# Patient Record
Sex: MALE | Race: WHITE | HISPANIC OR LATINO | Employment: FULL TIME | URBAN - NONMETROPOLITAN AREA
[De-identification: names, ages, dates, MRNs, and addresses within clinical notes are randomized per-mention and may not be internally consistent; named-entity substitution may affect disease eponyms.]

---

## 2017-08-09 ENCOUNTER — OFFICE VISIT (OUTPATIENT)
Dept: GASTROENTEROLOGY | Facility: CLINIC | Age: 42
End: 2017-08-09

## 2017-08-09 VITALS
HEART RATE: 70 BPM | OXYGEN SATURATION: 99 % | DIASTOLIC BLOOD PRESSURE: 76 MMHG | SYSTOLIC BLOOD PRESSURE: 106 MMHG | TEMPERATURE: 97.6 F | HEIGHT: 65 IN | WEIGHT: 136 LBS | BODY MASS INDEX: 22.66 KG/M2

## 2017-08-09 DIAGNOSIS — D64.9 ANEMIA, UNSPECIFIED TYPE: Primary | ICD-10-CM

## 2017-08-09 DIAGNOSIS — K62.5 BRBPR (BRIGHT RED BLOOD PER RECTUM): ICD-10-CM

## 2017-08-09 PROCEDURE — 99204 OFFICE O/P NEW MOD 45 MIN: CPT | Performed by: NURSE PRACTITIONER

## 2017-08-09 RX ORDER — MULTIPLE VITAMINS W/ MINERALS TAB 9MG-400MCG
1 TAB ORAL DAILY
COMMUNITY

## 2017-08-09 NOTE — PROGRESS NOTES
"[facility] GASTROENTEROLOGY - OFFICE NOTE    8/9/2017    Hai Duarte   1975    Primary Physician: Joel Palomo MD    Chief Complaint   Patient presents with   • Anemia   brbpr      HISTORY OF PRESENT ILLNESS    Hai Duarte is a 42 y.o. male presents with Microcytic anemia. States was diagnosed with low iron 2 years ago.  He did not keep this annual office visit last year with his primary care physician.  Recently he was told anemic again.  He just started on an over-the-counter iron supplementation.  He has never seen a hematologist.  He donated blood 2 years ago.  No history of blood transfusion.  He does have intermittent episodes of bright red blood per rectum over the last 7 years.  This is usually a small amount.  States that he had a colonoscopy around 7 years ago by Dr. Bhakta which noted some hemorrhoids.  He also admits to having EGD as well as M2A capsule study 7 years ago and was told he had a \"duodenal ulcer\".  He denies black stool.  Denies abdominal pain.  No unintentional weight loss.  No nausea vomiting or fevers.  Appetite is good.  He has been on AcipHex in the past, stopped 6 months ago and acid reflux symptoms are stable.  If he does have any issues with reflux or use Pepcid complete when necessary.  No aspirin or NSAIDs.      I have no CBC for review.  I do have iron studies from 07/21/2017 which were unremarkable.    Past Medical History:   Diagnosis Date   • History of duodenal ulcer     Per patient history   • History of hemorrhoids     Per patient history       Past Surgical History:   Procedure Laterality Date   • APPENDECTOMY     • CAPSULE ENDOSCOPY      m2a done 4/06.  dr bhakta.   • CHOLECYSTECTOMY     • COLONOSCOPY      3/06, dr bhakta.        Outpatient Prescriptions Marked as Taking for the 8/9/17 encounter (Office Visit) with FABIOLA Mayers   Medication Sig Dispense Refill   • FIBER PO Take  by mouth Daily.     • Multiple Vitamins-Minerals (MULTIVITAMIN WITH MINERALS) " "tablet tablet Take 1 tablet by mouth Daily.     • Probiotic Product (PROBIOTIC PO) Take  by mouth Daily.         No Known Allergies    Social History     Social History   • Marital status:      Spouse name: N/A   • Number of children: N/A   • Years of education: N/A     Occupational History   • Not on file.     Social History Main Topics   • Smoking status: Former Smoker   • Smokeless tobacco: Never Used   • Alcohol use Yes      Comment: occ   • Drug use: No   • Sexual activity: Not on file     Other Topics Concern   • Not on file     Social History Narrative       Family History   Problem Relation Age of Onset   • Colon cancer Neg Hx    • Colon polyps Neg Hx        Review of Systems   Constitutional: Negative for appetite change, chills, fatigue, fever and unexpected weight change.   HENT: Negative for sore throat and trouble swallowing.    Respiratory: Negative for cough, chest tightness, shortness of breath and wheezing.    Cardiovascular: Negative for chest pain and palpitations.   Gastrointestinal: Positive for anal bleeding and blood in stool. Negative for abdominal distention, abdominal pain, constipation, diarrhea, nausea, rectal pain and vomiting.        As mentioned in hpi   Genitourinary: Negative for difficulty urinating, dysuria and hematuria.   Musculoskeletal: Negative for arthralgias and back pain.   Skin: Negative for color change and rash.   Neurological: Positive for light-headedness (occasional). Negative for dizziness, syncope and headaches.   Psychiatric/Behavioral: Negative for confusion. The patient is not nervous/anxious.        Vitals:    08/09/17 0816   BP: 106/76   BP Location: Left arm   Patient Position: Sitting   Cuff Size: Adult   Pulse: 70   Temp: 97.6 °F (36.4 °C)   SpO2: 99%   Weight: 136 lb (61.7 kg)   Height: 65\" (165.1 cm)      Body mass index is 22.63 kg/(m^2).    Physical Exam   Constitutional: He appears well-developed and well-nourished. No distress.   HENT:   Head: " Normocephalic and atraumatic.   Eyes: EOM are normal. No scleral icterus.   Neck: Neck supple. No JVD present.   Cardiovascular: Normal rate, regular rhythm and normal heart sounds.    Pulmonary/Chest: Effort normal and breath sounds normal. No stridor.   Abdominal: Soft. Bowel sounds are normal. He exhibits no distension and no mass. There is no tenderness. There is no rebound and no guarding.   Musculoskeletal: He exhibits no edema.   Neurological: He is alert.   Skin: Skin is warm and dry. No rash noted.   Psychiatric: He has a normal mood and affect. His behavior is normal.   Vitals reviewed.      No results found for this or any previous visit.        ASSESSMENT AND PLAN    Hai was seen today for anemia.    Diagnoses and all orders for this visit:    Anemia, unspecified type  -     Case Request; Standing  -     Case Request    BRBPR (bright red blood per rectum)  -     Case Request; Standing  -     Case Request    Other orders  -     Implement Anesthesia Orders Day of Procedure; Standing  -     Obtain Informed Consent; Standing  -     Sod Picosulfate-Mag Ox-Cit Acd (PREPOPIK) 10-3.5-12 MG-GM-GM pack; Take 1 container by mouth 1 (One) Time for 1 dose. Take as directed per instruction sheet      Differential diagnosis discussed.  Request labs/cbc  from pcp.  Plan for colonoscopy.  ER if worsening bleeding.     COLONOSCOPY WITH ANESTHESIA (N/A) All risks, benefits, alternatives, and indications of colonoscopy procedure have been discussed with the patient. Risks to include perforation of the colon requiring possible surgery or colostomy, risk of bleeding from biopsies or removal of colon tissue, possibility of missing a colon polyp or cancer, or adverse drug reaction.  Benefits to include the diagnosis and management of disease of the colon and rectum. Alternatives to include barium enema, radiographic evaluation, lab testing or no intervention. Pt verbalizes understanding and agrees.         Body mass index is  22.63 kg/(m^2).           Trena Stahl, FABIOLA    EMR Dragon/transcription disclaimer:  Much of this encounter note is electronic transcription/translation of spoken language to printed text.  The electronic translation of spoken language may be erroneous, or at times, nonsensical words or phrases may be inadvertently transcribed.  Although I have reviewed the note for such errors, some may still exist.      Labs done 7/20/17 ferritin wnl,  fe wnl, tibc wnl, fe sat wnl.   Colonoscopy done 3/06,  Normal , normal TI.  Dr klein.      m2a done 4/06, small pyloric channel ulceration.

## 2017-09-29 ENCOUNTER — ANESTHESIA EVENT (OUTPATIENT)
Dept: GASTROENTEROLOGY | Facility: HOSPITAL | Age: 42
End: 2017-09-29

## 2017-09-29 ENCOUNTER — TELEPHONE (OUTPATIENT)
Dept: GASTROENTEROLOGY | Facility: CLINIC | Age: 42
End: 2017-09-29

## 2017-09-29 ENCOUNTER — ANESTHESIA (OUTPATIENT)
Dept: GASTROENTEROLOGY | Facility: HOSPITAL | Age: 42
End: 2017-09-29

## 2017-09-29 ENCOUNTER — HOSPITAL ENCOUNTER (OUTPATIENT)
Facility: HOSPITAL | Age: 42
Setting detail: HOSPITAL OUTPATIENT SURGERY
Discharge: HOME | End: 2017-09-29
Attending: INTERNAL MEDICINE | Admitting: INTERNAL MEDICINE

## 2017-09-29 VITALS
HEART RATE: 63 BPM | OXYGEN SATURATION: 100 % | RESPIRATION RATE: 13 BRPM | BODY MASS INDEX: 21.38 KG/M2 | WEIGHT: 133 LBS | SYSTOLIC BLOOD PRESSURE: 115 MMHG | DIASTOLIC BLOOD PRESSURE: 76 MMHG | TEMPERATURE: 97.4 F | HEIGHT: 66 IN

## 2017-09-29 PROCEDURE — 25010000002 PROPOFOL 10 MG/ML EMULSION: Performed by: NURSE ANESTHETIST, CERTIFIED REGISTERED

## 2017-09-29 PROCEDURE — 45378 DIAGNOSTIC COLONOSCOPY: CPT | Performed by: INTERNAL MEDICINE

## 2017-09-29 RX ORDER — SODIUM CHLORIDE 9 MG/ML
500 INJECTION, SOLUTION INTRAVENOUS CONTINUOUS PRN
Status: DISCONTINUED | OUTPATIENT
Start: 2017-09-29 | End: 2017-09-29 | Stop reason: HOSPADM

## 2017-09-29 RX ORDER — ONDANSETRON 2 MG/ML
4 INJECTION INTRAMUSCULAR; INTRAVENOUS ONCE AS NEEDED
Status: DISCONTINUED | OUTPATIENT
Start: 2017-09-29 | End: 2017-09-29 | Stop reason: HOSPADM

## 2017-09-29 RX ORDER — SODIUM CHLORIDE 9 MG/ML
100 INJECTION, SOLUTION INTRAVENOUS CONTINUOUS
Status: CANCELLED | OUTPATIENT
Start: 2017-09-29

## 2017-09-29 RX ORDER — SODIUM CHLORIDE 0.9 % (FLUSH) 0.9 %
1-10 SYRINGE (ML) INJECTION AS NEEDED
Status: CANCELLED | OUTPATIENT
Start: 2017-09-29

## 2017-09-29 RX ORDER — PROPOFOL 10 MG/ML
VIAL (ML) INTRAVENOUS AS NEEDED
Status: DISCONTINUED | OUTPATIENT
Start: 2017-09-29 | End: 2017-09-29 | Stop reason: SURG

## 2017-09-29 RX ORDER — LIDOCAINE HYDROCHLORIDE 20 MG/ML
INJECTION, SOLUTION INFILTRATION; PERINEURAL AS NEEDED
Status: DISCONTINUED | OUTPATIENT
Start: 2017-09-29 | End: 2017-09-29 | Stop reason: SURG

## 2017-09-29 RX ORDER — SODIUM CHLORIDE 0.9 % (FLUSH) 0.9 %
3 SYRINGE (ML) INJECTION AS NEEDED
Status: DISCONTINUED | OUTPATIENT
Start: 2017-09-29 | End: 2017-09-29 | Stop reason: HOSPADM

## 2017-09-29 RX ADMIN — LIDOCAINE HYDROCHLORIDE 0.5 ML: 10 INJECTION, SOLUTION EPIDURAL; INFILTRATION; INTRACAUDAL; PERINEURAL at 07:11

## 2017-09-29 RX ADMIN — SODIUM CHLORIDE 500 ML: 9 INJECTION, SOLUTION INTRAVENOUS at 07:11

## 2017-09-29 RX ADMIN — PROPOFOL 200 MG: 10 INJECTION, EMULSION INTRAVENOUS at 08:01

## 2017-09-29 RX ADMIN — LIDOCAINE HYDROCHLORIDE 50 MG: 20 INJECTION, SOLUTION INFILTRATION; PERINEURAL at 08:01

## 2017-09-29 NOTE — ANESTHESIA PREPROCEDURE EVALUATION
Anesthesia Evaluation     no history of anesthetic complications:  NPO Solid Status: > 8 hours  NPO Liquid Status: > 2 hours     Airway   Mallampati: I  TM distance: >3 FB  Neck ROM: full  no difficulty expected  Dental - normal exam     Pulmonary - normal exam    breath sounds clear to auscultation  (-) asthma, recent URI, sleep apnea, not a smoker  Cardiovascular - normal exam  Exercise tolerance: excellent (>7 METS)    Rhythm: regular  Rate: normal    (-) pacemaker, hypertension, past MI, angina, cardiac stents, CABG      Neuro/Psych  (-) seizures, TIA, CVA  GI/Hepatic/Renal/Endo    (-)  obesity, liver disease, no renal disease, diabetes, hypothyroidism, hyperthyroidism    Musculoskeletal     Abdominal    Substance History      OB/GYN          Other                                        Anesthesia Plan    ASA 1     general   total IV anesthesia  intravenous induction   Anesthetic plan and risks discussed with patient.

## 2017-09-29 NOTE — ANESTHESIA POSTPROCEDURE EVALUATION
Patient: Hai Duarte    Procedure Summary     Date Anesthesia Start Anesthesia Stop Room / Location    09/29/17 0755 0819  [facility] /  [facility]       Procedure Diagnosis Surgeon Provider    COLONOSCOPY WITH ANESTHESIA (N/A ) BRBPR (bright red blood per rectum); Anemia, unspecified type  (BRBPR (bright red blood per rectum) [K62.5]; Anemia, unspecified type [D64.9]) MD Ann Hernandez CRNA          Anesthesia Type: general  Last vitals  BP   117/76 (09/29/17 0835)    Temp        Pulse   63 (09/29/17 0835)   Resp   23 (09/29/17 0835)    SpO2   100 % (09/29/17 0835)      Post Anesthesia Care and Evaluation    Patient location during evaluation: PHASE II  Patient participation: complete - patient participated  Level of consciousness: awake and alert  Pain score: 0  Pain management: satisfactory to patient  Airway patency: patent  Anesthetic complications: No anesthetic complications  PONV Status: none  Cardiovascular status: acceptable  Respiratory status: acceptable  Hydration status: acceptable

## 2020-11-10 ENCOUNTER — CONSULT (OUTPATIENT)
Dept: ONCOLOGY | Facility: CLINIC | Age: 45
End: 2020-11-10

## 2020-11-10 ENCOUNTER — LAB (OUTPATIENT)
Dept: LAB | Facility: HOSPITAL | Age: 45
End: 2020-11-10

## 2020-11-10 VITALS
RESPIRATION RATE: 16 BRPM | SYSTOLIC BLOOD PRESSURE: 110 MMHG | BODY MASS INDEX: 23.01 KG/M2 | DIASTOLIC BLOOD PRESSURE: 70 MMHG | TEMPERATURE: 98.2 F | HEIGHT: 66 IN | WEIGHT: 143.2 LBS | HEART RATE: 66 BPM | OXYGEN SATURATION: 98 %

## 2020-11-10 DIAGNOSIS — D64.9 ANEMIA, UNSPECIFIED TYPE: Primary | ICD-10-CM

## 2020-11-10 DIAGNOSIS — D64.9 ANEMIA, UNSPECIFIED TYPE: ICD-10-CM

## 2020-11-10 LAB
ALBUMIN SERPL-MCNC: 4.6 G/DL (ref 3.5–5.2)
ALBUMIN/GLOB SERPL: 1.5 G/DL
ALP SERPL-CCNC: 71 U/L (ref 39–117)
ALT SERPL W P-5'-P-CCNC: 28 U/L (ref 1–41)
ANION GAP SERPL CALCULATED.3IONS-SCNC: 8 MMOL/L (ref 5–15)
ANISOCYTOSIS BLD QL: ABNORMAL
AST SERPL-CCNC: 21 U/L (ref 1–40)
BASOPHILS # BLD MANUAL: 0.19 10*3/MM3 (ref 0–0.2)
BASOPHILS NFR BLD AUTO: 3.1 % (ref 0–1.5)
BILIRUB SERPL-MCNC: 0.2 MG/DL (ref 0–1.2)
BUN SERPL-MCNC: 13 MG/DL (ref 6–20)
BUN/CREAT SERPL: 11.8 (ref 7–25)
CALCIUM SPEC-SCNC: 9.2 MG/DL (ref 8.6–10.5)
CHLORIDE SERPL-SCNC: 103 MMOL/L (ref 98–107)
CO2 SERPL-SCNC: 29 MMOL/L (ref 22–29)
CREAT SERPL-MCNC: 1.1 MG/DL (ref 0.76–1.27)
DEPRECATED RDW RBC AUTO: 40 FL (ref 37–54)
EOSINOPHIL # BLD MANUAL: 0.19 10*3/MM3 (ref 0–0.4)
EOSINOPHIL NFR BLD MANUAL: 3.1 % (ref 0.3–6.2)
ERYTHROCYTE [DISTWIDTH] IN BLOOD BY AUTOMATED COUNT: 17.5 % (ref 12.3–15.4)
FERRITIN SERPL-MCNC: 290.7 NG/ML (ref 30–400)
GFR SERPL CREATININE-BSD FRML MDRD: 72 ML/MIN/1.73
GLOBULIN UR ELPH-MCNC: 3.1 GM/DL
GLUCOSE SERPL-MCNC: 101 MG/DL (ref 65–99)
HCT VFR BLD AUTO: 43.8 % (ref 37.5–51)
HGB BLD-MCNC: 13.2 G/DL (ref 13–17.7)
IRON 24H UR-MRATE: 97 MCG/DL (ref 59–158)
IRON SATN MFR SERPL: 25 % (ref 20–50)
LYMPHOCYTES # BLD MANUAL: 2.86 10*3/MM3 (ref 0.7–3.1)
LYMPHOCYTES NFR BLD MANUAL: 12.2 % (ref 5–12)
LYMPHOCYTES NFR BLD MANUAL: 45.9 % (ref 19.6–45.3)
MCH RBC QN AUTO: 21.1 PG (ref 26.6–33)
MCHC RBC AUTO-ENTMCNC: 30.1 G/DL (ref 31.5–35.7)
MCV RBC AUTO: 69.9 FL (ref 79–97)
MICROCYTES BLD QL: ABNORMAL
MONOCYTES # BLD AUTO: 0.76 10*3/MM3 (ref 0.1–0.9)
NEUTROPHILS # BLD AUTO: 2.23 10*3/MM3 (ref 1.7–7)
NEUTROPHILS NFR BLD MANUAL: 35.7 % (ref 42.7–76)
OVALOCYTES BLD QL SMEAR: ABNORMAL
PLAT MORPH BLD: NORMAL
PLATELET # BLD AUTO: 273 10*3/MM3 (ref 140–450)
PMV BLD AUTO: 9.3 FL (ref 6–12)
POIKILOCYTOSIS BLD QL SMEAR: ABNORMAL
POTASSIUM SERPL-SCNC: 4 MMOL/L (ref 3.5–5.2)
PROT SERPL-MCNC: 7.7 G/DL (ref 6–8.5)
RBC # BLD AUTO: 6.27 10*6/MM3 (ref 4.14–5.8)
SODIUM SERPL-SCNC: 140 MMOL/L (ref 136–145)
TIBC SERPL-MCNC: 395 MCG/DL (ref 298–536)
TRANSFERRIN SERPL-MCNC: 265 MG/DL (ref 200–360)
WBC # BLD AUTO: 6.24 10*3/MM3 (ref 3.4–10.8)
WBC MORPH BLD: NORMAL

## 2020-11-10 PROCEDURE — 83021 HEMOGLOBIN CHROMOTOGRAPHY: CPT

## 2020-11-10 PROCEDURE — 80053 COMPREHEN METABOLIC PANEL: CPT

## 2020-11-10 PROCEDURE — 36415 COLL VENOUS BLD VENIPUNCTURE: CPT

## 2020-11-10 PROCEDURE — 85007 BL SMEAR W/DIFF WBC COUNT: CPT

## 2020-11-10 PROCEDURE — 84466 ASSAY OF TRANSFERRIN: CPT

## 2020-11-10 PROCEDURE — 82525 ASSAY OF COPPER: CPT

## 2020-11-10 PROCEDURE — 85025 COMPLETE CBC W/AUTO DIFF WBC: CPT

## 2020-11-10 PROCEDURE — 82728 ASSAY OF FERRITIN: CPT

## 2020-11-10 PROCEDURE — 99204 OFFICE O/P NEW MOD 45 MIN: CPT | Performed by: INTERNAL MEDICINE

## 2020-11-10 PROCEDURE — 85660 RBC SICKLE CELL TEST: CPT

## 2020-11-10 PROCEDURE — 84630 ASSAY OF ZINC: CPT

## 2020-11-10 PROCEDURE — 83540 ASSAY OF IRON: CPT

## 2020-11-10 NOTE — PROGRESS NOTES
[facility] GROUP HEMATOLOGY AND ONCOLOGY  [address]  [place] [address] [phone]    Patient Name: Hai Duarte  Encounter Date: 11/23/2020  YOB: 1975  Patient Number: [id number]      Subjective: 45 year old male who is being referred to hematology for secondary polycythemia.  Review of previous notes shows that the patient was seen by GI in 2017 when he had several years of on and off bright rec blood per rectum and was noted to have a microcytic anemia.  He started a over the counter iron supplementation .  At that time, he states that he had intermittent episodes of BRBPR and was found to have internal hemerrhoids that was described as small amounts with a colonsocopy that had been done approximately in 2010 that showed hemorrhoids. He had also had a EGD and a small bowel pill endoscopy that reportedly had shown a duodenal ulcer.     A repeat colonoscopy done 9/2017 showed:   - The examined portion of the ileum was normal.  - The entire examined colon is normal on direct and retroflexion views.  - No specimens collected.    It is not entirely clear but from the labs sent to our office, it seems that the patient may have been referred due to a high RBC number but NORMAL Hemoglobin and Hematocrit    Patient states that he he has some BRBPR when he strains hard which stops on it's own.  No other noted bleeding. Denies weight loss and in fact has gained some weight.     Patient was born in [facility] but he is of [place] ancestory.    On follow up on 11.23.20: no new complaints other than back pain from trying to do flips with an 8 year old    Past Medical History:   Diagnosis Date   • History of duodenal ulcer     Per patient history   • History of hemorrhoids     Per patient history       Oncology History:  Oncology/Hematology History    No history exists.       Past Surgical History:  Past Surgical History:   Procedure  Laterality Date   • APPENDECTOMY     • CAPSULE ENDOSCOPY      m2a done .  dr klein.   • CHOLECYSTECTOMY     • COLONOSCOPY      3/06, dr klein.    • COLONOSCOPY N/A 2017    Procedure: COLONOSCOPY WITH ANESTHESIA;  Surgeon: Prabhjot Bhatt MD;  Location: [facility] ENDOSCOPY;  Service:       ___________________________________________________________________________________________________________________________________________________  Medications:   Outpatient Encounter Medications as of 2020   Medication Sig Dispense Refill   • FIBER PO Take  by mouth Daily.     • Multiple Vitamins-Minerals (MULTIVITAMIN WITH MINERALS) tablet tablet Take 1 tablet by mouth Daily.     • Probiotic Product (PROBIOTIC PO) Take  by mouth Daily.       No facility-administered encounter medications on file as of 2020.      ___________________________________________________________________________________________________________________________________________________  Allergies:   Allergies   Allergen Reactions   • Doxycycline Rash       Social/Family History:   Family History   Problem Relation Age of Onset   • Diabetes Mother    • Hypertension Father    • Bipolar disorder Father    • Obesity Father    • No Known Problems Sister    • No Known Problems Brother    • No Known Problems Maternal Grandmother    • No Known Problems Maternal Grandfather    • No Known Problems Paternal Grandmother    • Emphysema Paternal Grandfather    • No Known Problems Brother    • Other Daughter         Mother of the daughter is Caucassian   • Colon cancer Neg Hx    • Colon polyps Neg Hx         /Single/:      Social History     Tobacco Use   • Smoking status: Former Smoker     Packs/day: 0.50     Years: 10.00     Pack years: 5.00     Types: Cigarettes     Quit date:      Years since quittin.8   • Smokeless tobacco: Never Used   Substance Use Topics   • Alcohol use: Yes     Comment: 2-3 weeks on weekends,  "usually beer sometimes burbon   • Drug use: No        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  No exposure to chemicals nor radiation  ___________________________________________________________________________________________________________________________________________________  Review of Systems   Constitutional: Negative for activity change, appetite change, chills, fatigue, fever, unexpected weight gain and unexpected weight loss.        8 lbs weight gain over a year   HENT: Negative for congestion, dental problem, ear pain, hearing loss, mouth sores, nosebleeds, sore throat, swollen glands and trouble swallowing.    Eyes: Negative for blurred vision, double vision, photophobia and pain.        Age related visual disturbances and he is supposed to wear glasses   Respiratory: Positive for shortness of breath. Negative for apnea, cough, chest tightness and wheezing.         SOB Comes on suddenly when \"I talk too much\" -- same on 11.23.20   Cardiovascular: Negative for chest pain, palpitations and leg swelling.   Gastrointestinal: Positive for diarrhea. Negative for abdominal distention, abdominal pain, blood in stool, constipation, nausea, vomiting and GERD.        Blood in the stool from hemerrhoid -- none on 11.23.20    RUQ fullness that comes on and off    Occassional diarrhea after GB removal -- same on 11.23.20   Endocrine: Negative for cold intolerance and heat intolerance.   Genitourinary: Negative for breast discharge, dysuria, frequency and hematuria.   Musculoskeletal: Negative for arthralgias, back pain, gait problem, myalgias and neck stiffness.   Skin: Positive for color change. Negative for pallor, rash, skin lesions and bruise.        In the area under the left ear on the mandible    More tanning of the skin but has been in the sun more this year   Allergic/Immunologic: Negative for environmental allergies and immunocompromised state.   Neurological: Negative for dizziness, seizures, " syncope, weakness, light-headedness, headache, memory problem and confusion.   Hematological: Negative for adenopathy. Does not bruise/bleed easily.   Psychiatric/Behavioral: Negative for suicidal ideas and depressed mood. The patient is not nervous/anxious.      ___________________________________________________________________________________________________________________________________________________  Physical Examination:   There were no vitals filed for this visit. There is no height or weight on file to calculate BSA.   Physical Exam  Constitutional:       Appearance: Normal appearance. He is normal weight.   HENT:      Head: Normocephalic and atraumatic.        Comments: Discoloration (darkening) area as above     Nose: Nose normal.      Mouth/Throat:      Mouth: Mucous membranes are dry.   Eyes:      Pupils: Pupils are equal, round, and reactive to light.   Neck:      Musculoskeletal: Neck supple.   Cardiovascular:      Rate and Rhythm: Normal rate and regular rhythm.      Pulses: Normal pulses.   Pulmonary:      Effort: Pulmonary effort is normal.      Breath sounds: Normal breath sounds.   Abdominal:      General: Abdomen is flat. Bowel sounds are normal.      Palpations: Abdomen is soft. There is no hepatomegaly or splenomegaly.   Musculoskeletal: Normal range of motion.   Lymphadenopathy:      Head:      Right side of head: No submental, submandibular, tonsillar, preauricular, posterior auricular or occipital adenopathy.      Left side of head: No submental, submandibular, tonsillar, preauricular, posterior auricular or occipital adenopathy.      Cervical: No cervical adenopathy.      Right cervical: No superficial or deep cervical adenopathy.     Left cervical: No superficial or deep cervical adenopathy.      Upper Body:      Right upper body: No supraclavicular or axillary adenopathy.      Left upper body: No supraclavicular or axillary adenopathy.      Lower Body: No right inguinal adenopathy. No  left inguinal adenopathy.   Skin:     General: Skin is warm and dry.      Comments: Tanned skin   Neurological:      Mental Status: He is alert and oriented to person, place, and time.   Psychiatric:         Mood and Affect: Mood normal.         Behavior: Behavior normal.      Comments: Very pleasant and cooperative       ___________________________________________________________________________________________________________________________________________________  Labs/X-ray results:     No results for input(s): HGB, HCT, MCV, WBC, RDW, MPV, PLT, AUTOIGPER, NEUTROABS, LYMPHSABS, MONOSABS, EOSABS, BASOSABS, AUTOIGNUM, NRBC, NEUTRELPCTM, MONOPCT, BASOPCT, ATYLMPCT, ANISOCYTOSIS, GIANTPLTS in the last 92634 hours.    Invalid input(s): LYMPHOCPCT, ABCMORPH    No results for input(s): GLUCOSE, NA, K, CO2, CL, ANIONGAP, CREATININE, BUN, BCR, CALCIUM, EGFRIFNONA, ALKPHOS, PROTEINTOT, ALT, AST, BILITOT, ALBUMIN, GLOB in the last 16225 hours.    Invalid input(s): LABIL    No results for input(s): MSPIKE, KAPPALAMB, IGLFLC, URICACID, FREEKAPPAL, CEA, LDH, REFLABREPO in the last 23679 hours.    No results for input(s): IRON, TIBC, LABIRON, FERRITIN, R4WQNMD, TSH, FOLATE in the last 16420 hours.    Invalid input(s): VITB12  ____________________________________________________________________________  Radiology: No results found.           ___________________________________________________________________________________________________________________________________________________  Assessment/Plans:   Date 7/20/17 10/1/20 11.10.20         WBC  6.8 6.24         RBC #  6.15 (H) 6.27         RDW   17.5% (H)         Hb  13.4 13.2         Hct  42.6 43.8         MCV  69 69.9         Plt  270 273         ANC  3.3 2.23         ALC  2.3 2.86         Ferritin 276  290         Iron 91  97         Iron SAT 29%  25%         Transferrin 223  265         TIBC 314  395         Hapto            LDH            Jareth            Antonio    79         Zinc   91         Retic            B12            Folate            Hep panel            HIV            Creatinine  1.08 1.10         Glucose  102 101         ALT  26 28         AST  20 21         Alk phos  60 71         PSA  0.5                                                              **High RBC with (low) normal Hb and Hct with LOW MCV, identified on 12.3.20 as alpha-0-thalassemia trait  -Patient is of  ancestry and with finding of a high RBC with a low normal hemoglobin and a low MCV with an increased RDW, brings to question possibility that the patient has a thalassemia  -In presence of  descent, the most common thalassemia form is a beta thalassemia but this does not mean that alpha thalassemia and not another possibility  -We will check the following tests in this patient:  1.  Hemoglobin electrophoresis Normal Adult hb as follows:   - Hb S Neg   - Hb F 0   - Hb A 98.1%   - Hb C 0   - Hb A2 1.9%  - as there is still suspicion for thalassemia, alpha thalessemia genetic studies will be sent on 11.23.20.  The results will be important for family members. Results returned on 12.3.20: Alpha-0-thalassemia trait, alpha alpha/-- Mutation(s) identified: --SEA   Interpretation: This result is most consistent with this individual being an unaffected carrier of alpha-thalassemia with two   alpha-globin gene deletions, also called alpha-0-thalassemia trait. Individuals with alpha-0-thalassemia trait can demonstrate microcytosis, hypocromia, and normal levels of HbA2 and HbF. However, they typically have no significant clinical findings. Additionally, this individual is negative for the Hemoglobin Constant Spring mutation. The presence of other non-deletional mutations, like point mutations, in the alpha-globin gene cannot be excluded as the assay does not detect these changes.  When both parents are carriers of   alpha-0-thalassemia, there is a 25% chance with each pregnancy that the child will be  "affected.  Co-inheritance of alpha-thalassemia and other hemoglobinopathies, such as beta-thalassemia and sickle cell disease is possible. Genetic counseling and hemoglobinopathy testing are recommended for this individual's reproductive partner and family members. Alpha-thalassemia is an autosomal recessive condition that results from a deletion or dysfunction of alpha-globin chains. There are four genes that make the alpha-globin   chain, which binds with the beta-globin chain to create the alpha alpha/beta beta hemoglobin tetramer. An imbalance of   alpha and beta chains can lead to disease, ranging from mild anemia to fatal hydrops fetalis. Hemoglobin Constant Spring   is a non-deletional alpha-thalassemia mutation that is more common in [place]. Hemoglobin Constant Spring is   caused by a mutation in the stop codon of the alpha(2)-globin gene that results in poor alpha-globin production.   2.  Iron profile (normal in 2017) WNL  3.  Ferritin (normal in 2017) WNL  4. Copper WNL  5. Zinc WNL  6. Peripheral smear: Elevated total red blood cell count with normal hemoglobin and hematocrit and microcytic hypochromic erythrocytes Slight/1+ target cells Normal total white blood cell count and differential     Platelets within normal range  ** Infection status:   · History of tinea barbae to be followed by PCP  **Metabolic / Renal:   · No current issues   ** Endocrine:   · No current issues   ** Coagulation / VTE prophylaxis:   - no current issues  ** GI:   -History of blood per rectum with colonoscopy, EGD and small bowel pill endoscopy showing (reportedly) \"duadenal ulcer\" and hemorrhoids in the past. To be followed by GI  ** Pulmonary:   · Current smoking status former  -Patient has a known history of snoring and was a smoker in the past, both of which can increase RBC number.  Currently on a CPAP  ** Cardiology:   - no current issues  ** Skin / Rash:   · No current issues   ** :   · BPH to be followed by " PCP  ** Rheumatology  - no current issues  ** Neurologic:   · No current issues   ** Psychosocial:   · Chronic depression and insomnia being followed by PCP  ** Pain control:   - no current issue  ** Health Maintenance  -  colonoscopy done 9/2017 showed: - The examined portion of the ileum was normal.- The entire examined colon is normal on direct and retroflexion views.- No specimens collected.  - EGD and small pill endoscopy  in approximately 2010 reportedly showed duodenal ulcer    - Can follow up with NP in 4-6 weeks    Amit Goldberg, MD

## 2020-11-11 LAB
HGB A MFR BLD: 98.1 % (ref 96.4–98.8)
HGB A2 MFR BLD COLUMN CHROM: 1.9 % (ref 1.8–3.2)
HGB C MFR BLD: 0 %
HGB F MFR BLD: 0 % (ref 0–2)
HGB FRACT BLD-IMP: NORMAL
HGB S BLD QL SOLY: NEGATIVE
HGB S MFR BLD: 0 %

## 2020-11-12 LAB — COPPER SERPL-MCNC: 79 UG/DL (ref 72–166)

## 2020-11-13 LAB — ZINC SERPL-MCNC: 91 UG/DL (ref 56–134)

## 2020-11-23 ENCOUNTER — OFFICE VISIT (OUTPATIENT)
Dept: ONCOLOGY | Facility: CLINIC | Age: 45
End: 2020-11-23

## 2020-11-23 ENCOUNTER — LAB (OUTPATIENT)
Dept: LAB | Facility: HOSPITAL | Age: 45
End: 2020-11-23

## 2020-11-23 VITALS
DIASTOLIC BLOOD PRESSURE: 78 MMHG | SYSTOLIC BLOOD PRESSURE: 122 MMHG | RESPIRATION RATE: 16 BRPM | HEIGHT: 66 IN | WEIGHT: 145.9 LBS | TEMPERATURE: 97.7 F | HEART RATE: 64 BPM | BODY MASS INDEX: 23.45 KG/M2 | OXYGEN SATURATION: 99 %

## 2020-11-23 DIAGNOSIS — D75.1 POLYCYTHEMIA: Primary | ICD-10-CM

## 2020-11-23 DIAGNOSIS — D75.1 POLYCYTHEMIA: ICD-10-CM

## 2020-11-23 PROCEDURE — 36415 COLL VENOUS BLD VENIPUNCTURE: CPT

## 2020-11-23 PROCEDURE — 81257 HBA1/HBA2 GENE: CPT

## 2020-11-23 PROCEDURE — 99212 OFFICE O/P EST SF 10 MIN: CPT | Performed by: INTERNAL MEDICINE

## 2020-11-23 PROCEDURE — 85060 BLOOD SMEAR INTERPRETATION: CPT

## 2020-11-24 LAB
CYTOLOGIST CVX/VAG CYTO: NORMAL
PATH INTERP BLD-IMP: NORMAL

## 2020-12-03 LAB — HBA1 GENE MUT ANL BLD/T: NORMAL

## 2020-12-22 ENCOUNTER — APPOINTMENT (OUTPATIENT)
Dept: LAB | Facility: HOSPITAL | Age: 45
End: 2020-12-22

## 2021-02-03 ENCOUNTER — IMMUNIZATION (OUTPATIENT)
Dept: VACCINE CLINIC | Facility: HOSPITAL | Age: 46
End: 2021-02-03

## 2021-02-03 PROCEDURE — 91301 HC SARSCO02 VAC 100MCG/0.5ML IM: CPT | Performed by: OBSTETRICS & GYNECOLOGY

## 2021-02-03 PROCEDURE — 0011A: CPT | Performed by: OBSTETRICS & GYNECOLOGY

## 2021-03-03 ENCOUNTER — IMMUNIZATION (OUTPATIENT)
Dept: VACCINE CLINIC | Facility: HOSPITAL | Age: 46
End: 2021-03-03

## 2021-03-03 PROCEDURE — 0012A: CPT | Performed by: OBSTETRICS & GYNECOLOGY

## 2021-03-03 PROCEDURE — 91301 HC SARSCO02 VAC 100MCG/0.5ML IM: CPT | Performed by: OBSTETRICS & GYNECOLOGY

## 2022-11-28 ENCOUNTER — NURSE TRIAGE (OUTPATIENT)
Dept: CALL CENTER | Facility: HOSPITAL | Age: 47
End: 2022-11-28

## 2022-11-28 NOTE — TELEPHONE ENCOUNTER
Caller advised to call office in am to clarify the Tamiflu prescription as he tested neg today. Caller agrees to follow care advice.    Reason for Disposition  • [1] Caller has NON-URGENT medicine question about med that PCP prescribed AND [2] triager unable to answer question    Additional Information  • Negative: [1] Intentional drug overdose AND [2] suicidal thoughts or ideas  • Negative: Drug overdose and triager unable to answer question  • Negative: Caller requesting information unrelated to medicine  • Negative: Caller requesting information about COVID-19 Vaccine  • Negative: Caller requesting information about Emergency Contraception  • Negative: Caller requesting information about Combined Birth Control Pills  • Negative: Caller requesting information about Progestin Birth Control Pills  • Negative: Caller requesting information about Post-Op pain or medicines  • Negative: Caller requesting a prescription antibiotic (such as Penicillin) for Strep throat and has a positive culture result  • Negative: Caller requesting a prescription anti-viral med (such as Tamiflu) and has influenza (flu)  symptoms  • Negative: Immunization reaction suspected  • Negative: Rash while taking a medicine or within 3 days of stopping it  • Negative: [1] Asthma and [2] having symptoms of asthma (cough, wheezing, etc.)  • Negative: [1] Symptom of illness (e.g., headache, abdominal pain, earache, vomiting) AND [2] more than mild  • Negative: Breastfeeding questions about mother's medicines and diet  • Negative: MORE THAN A DOUBLE DOSE of a prescription or over-the-counter (OTC) drug  • Negative: [1] DOUBLE DOSE (an extra dose or lesser amount) of prescription drug AND [2] any symptoms (e.g., dizziness, nausea, pain, sleepiness)  • Negative: [1] DOUBLE DOSE (an extra dose or lesser amount) of over-the-counter (OTC) drug AND [2] any symptoms (e.g., dizziness, nausea, pain, sleepiness)  • Negative: Took another person's prescription  "drug  • Negative: [1] DOUBLE DOSE (an extra dose or lesser amount) of prescription drug AND [2] NO symptoms (Exception: a double dose of antibiotics)  • Negative: Diabetes drug error or overdose (e.g., took wrong type of insulin or took extra dose)  • Negative: [1] Prescription refill request for ESSENTIAL medicine (i.e., likelihood of harm to patient if not taken) AND [2] triager unable to refill per department policy  • Negative: [1] Prescription not at pharmacy AND [2] was prescribed by PCP recently (Exception: triager has access to EMR and prescription is recorded there. Go to Home Care and confirm for pharmacy.)  • Negative: [1] Pharmacy calling with prescription question AND [2] triager unable to answer question  • Negative: [1] Caller has URGENT medicine question about med that PCP or specialist prescribed AND [2] triager unable to answer question  • Negative: Medicine patch causing local rash or itching  • Negative: [1] Caller has medicine question about med NOT prescribed by PCP AND [2] triager unable to answer question (e.g., compatibility with other med, storage)  • Negative: Prescription request for new medicine (not a refill)  • Negative: Prescription refill request for a CONTROLLED substance (e.g., narcotics, ADHD medicines)  • Negative: [1] Prescription refill request for NON-ESSENTIAL medicine (i.e., no harm to patient if med not taken) AND [2] triager unable to refill per department policy    Answer Assessment - Initial Assessment Questions  1. NAME of MEDICATION: \"What medicine are you calling about?\"      Asking about Tamiflu prescription  2. QUESTION: \"What is your question?\" (e.g., medication refill, side effect)      Asking if the prescription was sent in error.   3. PRESCRIBING HCP: \"Who prescribed it?\" Reason: if prescribed by specialist, call should be referred to that group.      *No Answer*  4. SYMPTOMS: \"Do you have any symptoms?\"      *No Answer*  5. SEVERITY: If symptoms are present, " "ask \"Are they mild, moderate or severe?\"      *No Answer*  6. PREGNANCY:  \"Is there any chance that you are pregnant?\" \"When was your last menstrual period?\"      *No Answer*    Protocols used: MEDICATION QUESTION CALL-ADULT-      "

## 2023-04-13 ENCOUNTER — OFFICE VISIT (OUTPATIENT)
Dept: PULMONOLOGY | Facility: CLINIC | Age: 48
End: 2023-04-13
Payer: COMMERCIAL

## 2023-04-13 VITALS
DIASTOLIC BLOOD PRESSURE: 76 MMHG | SYSTOLIC BLOOD PRESSURE: 122 MMHG | HEART RATE: 68 BPM | HEIGHT: 66 IN | BODY MASS INDEX: 22.66 KG/M2 | WEIGHT: 141 LBS | OXYGEN SATURATION: 99 %

## 2023-04-13 DIAGNOSIS — Z86.16 HISTORY OF COVID-19: ICD-10-CM

## 2023-04-13 DIAGNOSIS — R05.9 COUGH, UNSPECIFIED TYPE: Primary | Chronic | ICD-10-CM

## 2023-04-13 DIAGNOSIS — J30.9 ALLERGIC RHINITIS, UNSPECIFIED SEASONALITY, UNSPECIFIED TRIGGER: ICD-10-CM

## 2023-04-13 PROCEDURE — 36415 COLL VENOUS BLD VENIPUNCTURE: CPT | Performed by: NURSE PRACTITIONER

## 2023-04-13 PROCEDURE — 99204 OFFICE O/P NEW MOD 45 MIN: CPT | Performed by: NURSE PRACTITIONER

## 2023-04-13 PROCEDURE — 94664 DEMO&/EVAL PT USE INHALER: CPT | Performed by: NURSE PRACTITIONER

## 2023-04-13 RX ORDER — TRIAMCINOLONE ACETONIDE 55 UG/1
SPRAY, METERED NASAL
COMMUNITY
Start: 2023-03-16

## 2023-04-13 RX ORDER — TIOTROPIUM BROMIDE INHALATION SPRAY 3.12 UG/1
2 SPRAY, METERED RESPIRATORY (INHALATION)
Qty: 2 EACH | Refills: 0 | COMMUNITY
Start: 2023-04-13

## 2023-04-13 RX ORDER — RABEPRAZOLE SODIUM 20 MG/1
1 TABLET, DELAYED RELEASE ORAL DAILY
COMMUNITY

## 2023-04-13 RX ORDER — MONTELUKAST SODIUM 10 MG/1
10 TABLET ORAL NIGHTLY
COMMUNITY

## 2023-04-13 NOTE — PROGRESS NOTES
"Chief Complaint  Asthma    Subjective    History of Present Illness      Hai Duarte presents to [facility] for:    History of Present Illness   New patient referral from Dr. Palomo with a chief complaint of persistent cough with wheezing that has been present since having had COVID in May 2022.  He reports that he has been treated with antibiotics and steroids 2-3 different times and he is better while on medication but then the cough returns.  He reports that the severity and frequency have decreased but the cough still persist.  He has a history of seasonal allergic rhinitis.  He was recently started on Singulair.  He also uses Nasacort nasal spray.  He smoked from the age of 13 to the age of 28.  He has never had any previous pulmonary issues.  He had [place] [facility] which I reviewed through the portal.  I recommend that he trial Spiriva 2 puffs daily.  Proper demonstration of the Respimat device was shown.  Labs have been drawn for IgE plus allergens, CBC with differential and CMP.  He will have complete PFTs done at [facility].  He stays up-to-date on most vaccines.  Objective   Vital Signs:   /76   Pulse 68   Ht 167.6 cm (66\")   Wt 64 kg (141 lb)   SpO2 99% Comment: RA  BMI 22.76 kg/m²     Physical Exam  Vitals and nursing note reviewed.   Constitutional:       Appearance: He is well-developed.   HENT:      Head: Normocephalic and atraumatic.   Eyes:      Pupils: Pupils are equal, round, and reactive to light.   Cardiovascular:      Rate and Rhythm: Normal rate and regular rhythm.   Abdominal:      General: Bowel sounds are normal. There is no distension.      Palpations: Abdomen is soft.   Musculoskeletal:         General: Normal range of motion.      Cervical back: Normal range of motion and neck supple.   Skin:     General: Skin is warm and dry.   Neurological:      Mental Status: He is alert and oriented to " person, place, and time.        Result Review :   The following data was reviewed by: FABIOLA Feldman on 04/13/2023:  Chest x-ray from [facility] diagnostic 6/8/2022  No results found for this or any previous visit.               Assessment and Plan      Diagnoses and all orders for this visit:    1. Cough, unspecified type (Primary)  Comments:  Present for the past 11 months.  PFTs at [facility].  Trial Spiriva 2 puffs daily.  Orders:  -     tiotropium bromide monohydrate (Spiriva Respimat) 2.5 MCG/ACT aerosol solution inhaler; Inhale 2 puffs Daily.  Dispense: 2 each; Refill: 0  -     Full Pulmonary Function Test With Bronchodilator; Future    2. Allergic rhinitis, unspecified seasonality, unspecified trigger  Comments:  Labs for IgE plus allergens and CBC with diff to rule out elevated eosinophils or an allergy driven cough.  Continue Singulair and nasal spray.  Orders:  -     CBC & Differential  -     IgE + Allergens (22)  -     Basic Metabolic Panel    3. History of COVID-19  Comments:  With persistent post viral cough.  Obtain complete PFT at [facility].  Trial Spiriva.        FABIOLA Feldman  4/13/2023  14:15 CDT    Follow Up   Return in about 4 weeks (around 5/11/2023) for CPFT at [facility], lab work.    Patient was given instructions and counseling regarding his condition or for health maintenance advice. Please see specific information pulled into the AVS if appropriate.

## 2023-04-13 NOTE — LETTER
"April 13, 2023     Joel Palomo MD  [address] [facility] [address]    Patient: Hai Duarte   YOB: 1975   Date of Visit: 4/13/2023     Dear Dr. Stacia MD:    Thank you for referring Hai Duarte to me for evaluation. Below are the relevant portions of my assessment and plan of care.    If you have questions, please do not hesitate to call me. I look forward to following Hai along with you.         Sincerely,        Mikie Diane, FABIOLA        CC: No Recipients      Progress Notes:  Chief Complaint  Asthma    Subjective     History of Present Illness      Hai Duarte presents to [facility]UL[facility]ARE [facility]IC[facility] for:    History of Present Illness   New patient referral from Dr. Palomo with a chief complaint of persistent cough with wheezing that has been present since having had COVID in May 2022.  He reports that he has been treated with antibiotics and steroids 2-3 different times and he is better while on medication but then the cough returns.  He reports that the severity and frequency have decreased but the cough still persist.  He has a history of seasonal allergic rhinitis.  He was recently started on Singulair.  He also uses Nasacort nasal spray.  He smoked from the age of 13 to the age of 28.  He has never had any previous pulmonary issues.  He had a normal chest x-ray done at [facility] on 6/8/2022 which I reviewed through the portal.  I recommend that he trial Spiriva 2 puffs daily.  Proper demonstration of the Respimat device was shown.  Labs have been drawn for IgE plus allergens, CBC with differential and CMP.  He will have complete PFTs done at [facility].  He stays up-to-date on most vaccines.  Objective    Vital Signs:   /76   Pulse 68   Ht 167.6 cm (66\")   Wt 64 kg (141 lb)   SpO2 99% Comment: RA  BMI 22.76 kg/m²     Physical Exam  Vitals and nursing note reviewed.   Constitutional:       Appearance: He is well-developed.   HENT: "      Head: Normocephalic and atraumatic.   Eyes:      Pupils: Pupils are equal, round, and reactive to light.   Cardiovascular:      Rate and Rhythm: Normal rate and regular rhythm.   Abdominal:      General: Bowel sounds are normal. There is no distension.      Palpations: Abdomen is soft.   Musculoskeletal:         General: Normal range of motion.      Cervical back: Normal range of motion and neck supple.   Skin:     General: Skin is warm and dry.   Neurological:      Mental Status: He is alert and oriented to person, place, and time.        Result Review :   The following data was reviewed by: FABIOLA Feldman on 04/13/2023:  Chest x-ray from [facility] 6/8/2022  No results found for this or any previous visit.              Assessment and Plan      Diagnoses and all orders for this visit:    1. Cough, unspecified type (Primary)  Comments:  Present for the past 11 months.  PFTs at [facility].  Trial Spiriva 2 puffs daily.  Orders:  -     tiotropium bromide monohydrate (Spiriva Respimat) 2.5 MCG/ACT aerosol solution inhaler; Inhale 2 puffs Daily.  Dispense: 2 each; Refill: 0  -     Full Pulmonary Function Test With Bronchodilator; Future    2. Allergic rhinitis, unspecified seasonality, unspecified trigger  Comments:  Labs for IgE plus allergens and CBC with diff to rule out elevated eosinophils or an allergy driven cough.  Continue Singulair and nasal spray.  Orders:  -     CBC & Differential  -     IgE + Allergens (22)  -     Basic Metabolic Panel    3. History of COVID-19  Comments:  With persistent post viral cough.  Obtain complete PFT at [facility].  Trial Spiriva.        FABIOLA Feldman  4/13/2023  14:15 CDT    Follow Up   Return in about 4 weeks (around 5/11/2023) for CPFT at [facility], lab work.    Patient was given instructions and counseling regarding his condition or for health maintenance advice. Please see specific information pulled into the AVS if appropriate.

## 2023-04-15 LAB
A ALTERNATA IGE QN: <0.1 KU/L
A FUMIGATUS IGE QN: <0.1 KU/L
BASOPHILS # BLD AUTO: 0.1 X10E3/UL (ref 0–0.2)
BASOPHILS NFR BLD AUTO: 2 %
BERMUDA GRASS IGE QN: <0.1 KU/L
BOXELDER IGE QN: <0.1 KU/L
BUN SERPL-MCNC: 14 MG/DL (ref 6–24)
BUN/CREAT SERPL: 13 (ref 9–20)
C HERBARUM IGE QN: <0.1 KU/L
CALCIUM SERPL-MCNC: 9.4 MG/DL (ref 8.7–10.2)
CAT DANDER IGE QN: <0.1 KU/L
CHLORIDE SERPL-SCNC: 101 MMOL/L (ref 96–106)
CO2 SERPL-SCNC: 26 MMOL/L (ref 20–29)
COMMON RAGWEED IGE QN: <0.1 KU/L
CONV CLASS DESCRIPTION: NORMAL
COTTONWOOD IGE QN: <0.1 KU/L
CREAT SERPL-MCNC: 1.06 MG/DL (ref 0.76–1.27)
D FARINAE IGE QN: <0.1 KU/L
D PTERONYSS IGE QN: <0.1 KU/L
DOG DANDER IGE QN: <0.1 KU/L
EGFRCR SERPLBLD CKD-EPI 2021: 86 ML/MIN/1.73
EOSINOPHIL # BLD AUTO: 0.3 X10E3/UL (ref 0–0.4)
EOSINOPHIL NFR BLD AUTO: 5 %
ERYTHROCYTE [DISTWIDTH] IN BLOOD BY AUTOMATED COUNT: 17.4 % (ref 11.6–15.4)
GLUCOSE SERPL-MCNC: 102 MG/DL (ref 70–99)
HCT VFR BLD AUTO: 46.3 % (ref 37.5–51)
HGB BLD-MCNC: 14.4 G/DL (ref 13–17.7)
IGE SERPL-ACNC: 195 IU/ML (ref 6–495)
IMM GRANULOCYTES # BLD AUTO: 0 X10E3/UL (ref 0–0.1)
IMM GRANULOCYTES NFR BLD AUTO: 0 %
LYMPHOCYTES # BLD AUTO: 2.2 X10E3/UL (ref 0.7–3.1)
LYMPHOCYTES NFR BLD AUTO: 32 %
MCH RBC QN AUTO: 21.5 PG (ref 26.6–33)
MCHC RBC AUTO-ENTMCNC: 31.1 G/DL (ref 31.5–35.7)
MCV RBC AUTO: 69 FL (ref 79–97)
MONOCYTES # BLD AUTO: 0.5 X10E3/UL (ref 0.1–0.9)
MONOCYTES NFR BLD AUTO: 8 %
MT JUNIPER IGE QN: <0.1 KU/L
NETTLE IGE QN: <0.1 KU/L
NEUTROPHILS # BLD AUTO: 3.6 X10E3/UL (ref 1.4–7)
NEUTROPHILS NFR BLD AUTO: 53 %
P NOTATUM IGE QN: <0.1 KU/L
PLATELET # BLD AUTO: 301 X10E3/UL (ref 150–450)
POTASSIUM SERPL-SCNC: 4.2 MMOL/L (ref 3.5–5.2)
RBC # BLD AUTO: 6.7 X10E6/UL (ref 4.14–5.8)
ROACH IGE QN: <0.1 KU/L
SALTWORT IGE QN: <0.1 KU/L
SHEEP SORREL IGE QN: <0.1 KU/L
SODIUM SERPL-SCNC: 140 MMOL/L (ref 134–144)
TIMOTHY IGE QN: <0.1 KU/L
WBC # BLD AUTO: 6.7 X10E3/UL (ref 3.4–10.8)
WHITE ASH IGE QN: <0.1 KU/L
WHITE ELM IGE QN: <0.1 KU/L
WHITE MULBERRY IGE QN: <0.1 KU/L
WHITE OAK IGE QN: <0.1 KU/L

## 2023-04-17 ENCOUNTER — HOSPITAL ENCOUNTER (OUTPATIENT)
Dept: PULMONOLOGY | Facility: HOSPITAL | Age: 48
Discharge: HOME | End: 2023-04-17
Admitting: NURSE PRACTITIONER
Payer: COMMERCIAL

## 2023-04-17 DIAGNOSIS — R05.9 COUGH, UNSPECIFIED TYPE: Primary | ICD-10-CM

## 2023-04-17 DIAGNOSIS — R05.9 COUGH, UNSPECIFIED TYPE: ICD-10-CM

## 2023-04-17 PROCEDURE — 94060 EVALUATION OF WHEEZING: CPT

## 2023-04-17 PROCEDURE — 94726 PLETHYSMOGRAPHY LUNG VOLUMES: CPT

## 2023-04-17 PROCEDURE — 94729 DIFFUSING CAPACITY: CPT

## 2023-04-17 PROCEDURE — 94729 DIFFUSING CAPACITY: CPT | Performed by: INTERNAL MEDICINE

## 2023-04-17 PROCEDURE — 94726 PLETHYSMOGRAPHY LUNG VOLUMES: CPT | Performed by: INTERNAL MEDICINE

## 2023-04-17 PROCEDURE — 94060 EVALUATION OF WHEEZING: CPT | Performed by: INTERNAL MEDICINE

## 2023-04-17 RX ORDER — ALBUTEROL SULFATE 2.5 MG/3ML
2.5 SOLUTION RESPIRATORY (INHALATION) ONCE
Status: COMPLETED | OUTPATIENT
Start: 2023-04-17 | End: 2023-04-17

## 2023-04-17 RX ADMIN — ALBUTEROL SULFATE 2.5 MG: 2.5 SOLUTION RESPIRATORY (INHALATION) at 09:46

## 2023-05-05 ENCOUNTER — TELEPHONE (OUTPATIENT)
Dept: PULMONOLOGY | Facility: CLINIC | Age: 48
End: 2023-05-05
Payer: COMMERCIAL

## 2023-05-05 DIAGNOSIS — R05.9 COUGH, UNSPECIFIED TYPE: Chronic | ICD-10-CM

## 2023-05-05 NOTE — TELEPHONE ENCOUNTER
"Patient called to say the Spiriva 2.5 seems to be \"helping his cough and he is tolerating it well\".  He was given 2 boxes of samples on 4/13/23 and is scheduled to come back in on 5/18/23.  Do you want him to have another sample or send in a prescription?  "

## 2023-05-08 RX ORDER — TIOTROPIUM BROMIDE INHALATION SPRAY 3.12 UG/1
2 SPRAY, METERED RESPIRATORY (INHALATION)
Qty: 1 EACH | Refills: 0 | COMMUNITY
Start: 2023-05-08

## 2023-05-18 ENCOUNTER — OFFICE VISIT (OUTPATIENT)
Dept: PULMONOLOGY | Facility: CLINIC | Age: 48
End: 2023-05-18
Payer: COMMERCIAL

## 2023-05-18 VITALS
OXYGEN SATURATION: 99 % | DIASTOLIC BLOOD PRESSURE: 70 MMHG | WEIGHT: 140 LBS | BODY MASS INDEX: 22.5 KG/M2 | SYSTOLIC BLOOD PRESSURE: 116 MMHG | HEART RATE: 78 BPM | HEIGHT: 66 IN

## 2023-05-18 DIAGNOSIS — J30.9 ALLERGIC RHINITIS, UNSPECIFIED SEASONALITY, UNSPECIFIED TRIGGER: Chronic | ICD-10-CM

## 2023-05-18 DIAGNOSIS — R05.9 COUGH, UNSPECIFIED TYPE: Primary | Chronic | ICD-10-CM

## 2023-05-18 DIAGNOSIS — Z86.16 HISTORY OF COVID-19: ICD-10-CM

## 2023-05-18 PROCEDURE — 99213 OFFICE O/P EST LOW 20 MIN: CPT | Performed by: NURSE PRACTITIONER

## 2023-05-18 RX ORDER — TIOTROPIUM BROMIDE INHALATION SPRAY 1.56 UG/1
2 SPRAY, METERED RESPIRATORY (INHALATION)
Qty: 1 EACH | Refills: 6 | Status: SHIPPED | OUTPATIENT
Start: 2023-05-18

## 2023-05-18 NOTE — PROGRESS NOTES
"Chief Complaint  Cough, unspecified type    Subjective    History of Present Illness      Hai Duarte presents to [facility]UL[facility]IT[facility]ARE MEDICINE for:    History of Present Illness   Follow-up after being seen as a new patient in April with persistent cough since having had COVID in May 2022.  He also has a history of seasonal allergic rhinitis.  He was trialed on Spiriva and has found it helpful for the cough and mucus production.  He underwent complete PFTs in April that showed a decrease in mid flows not helped by bronchodilator.  IgE plus allergens came back normal.  CBC with differential came back with some abnormalities but the patient has thalassemia.  He continues taking singular and Nasacort.  I recommend that for now he continue on low-dose Spiriva and at some point he can try himself off of it to see if the cough has resolved.  Otherwise, he has no new or worsening complaints.  He is up-to-date on some vaccines.  Objective   Vital Signs:   /70   Pulse 78   Ht 167.6 cm (66\")   Wt 63.5 kg (140 lb)   SpO2 99% Comment: RA  BMI 22.60 kg/m²     Physical Exam  Vitals and nursing note reviewed.   Constitutional:       Appearance: He is well-developed.   HENT:      Head: Normocephalic and atraumatic.   Eyes:      Pupils: Pupils are equal, round, and reactive to light.   Cardiovascular:      Rate and Rhythm: Normal rate and regular rhythm.   Abdominal:      General: Bowel sounds are normal. There is no distension.      Palpations: Abdomen is soft.   Musculoskeletal:         General: Normal range of motion.      Cervical back: Normal range of motion and neck supple.   Skin:     General: Skin is warm and dry.   Neurological:      Mental Status: He is alert and oriented to person, place, and time.      Result Review :   The following data was reviewed by: FABIOLA Feldman on 05/18/2023:  IgE + Allergens (22) (04/13/2023 10:08)   CBC & Differential (04/13/2023 " 10:08)   Results for orders placed during the hospital encounter of 04/17/23    Full Pulmonary Function Test With Bronchodilator    Narrative  [facility] - Pulmonary Function Test    [address] [facility]  [address]  [phone]    Patient : Hai Duarte  MRN : [id number]  CSN : [id number]  Pulmonologist : Joel Farrell MD  Date : 4/18/2023    ______________________________________________________________________    Interpretation :  1.  Spirometry reveals a decrease in midflows, and otherwise is within normal limits.  2.  There is actually worsening of midflows postbronchodilator and also worsening a peak expiratory flow postbronchodilator which is now at the lower range of normal.  Otherwise there is no significant change in spirometry and postbronchodilator spirometry is otherwise within normal limits.  3.  Lung volumes are consistent with a mild restrictive ventilatory defect and there is also a mild decrease in inspiratory capacity.  4.  Diffusion capacity is within normal limits particularly when corrected for alveolar volume.      Joel Farrell MD               Assessment and Plan      Diagnoses and all orders for this visit:    1. Cough, unspecified type (Primary)  Comments:  With some improvement on Spiriva.  Continue use of Spiriva for now.  No further pulmonary work-up planned.  Orders:  -     Tiotropium Bromide Monohydrate (Spiriva Respimat) 1.25 MCG/ACT aerosol solution inhaler; Inhale 2 puffs Daily.  Dispense: 1 each; Refill: 6    2. Allergic rhinitis, unspecified seasonality, unspecified trigger  Comments:  Stable.  Continue Singulair and Nasacort.    3. History of COVID-19  Comments:  Possible post viral cough related to COVID.  Continue Spiriva for now.        Mikie Diane, APRN  5/18/2023  13:25 CDT    Follow Up   Return in about 9 months (around 2/18/2024).    Patient was given instructions and counseling regarding his condition or for health maintenance  advice. Please see specific information pulled into the AVS if appropriate.

## 2024-02-20 ENCOUNTER — OFFICE VISIT (OUTPATIENT)
Dept: PULMONOLOGY | Facility: CLINIC | Age: 49
End: 2024-02-20
Payer: COMMERCIAL

## 2024-02-20 VITALS
HEIGHT: 66 IN | SYSTOLIC BLOOD PRESSURE: 116 MMHG | BODY MASS INDEX: 22.18 KG/M2 | WEIGHT: 138 LBS | OXYGEN SATURATION: 98 % | HEART RATE: 72 BPM | DIASTOLIC BLOOD PRESSURE: 68 MMHG

## 2024-02-20 DIAGNOSIS — R05.9 COUGH, UNSPECIFIED TYPE: Primary | Chronic | ICD-10-CM

## 2024-02-20 DIAGNOSIS — Z86.16 HISTORY OF COVID-19: ICD-10-CM

## 2024-02-20 DIAGNOSIS — J30.9 ALLERGIC RHINITIS, UNSPECIFIED SEASONALITY, UNSPECIFIED TRIGGER: Chronic | ICD-10-CM

## 2024-02-20 PROCEDURE — 99213 OFFICE O/P EST LOW 20 MIN: CPT | Performed by: NURSE PRACTITIONER

## 2024-02-20 RX ORDER — ATORVASTATIN CALCIUM 20 MG/1
20 TABLET, FILM COATED ORAL
COMMUNITY
Start: 2024-01-04

## 2024-02-20 RX ORDER — FAMOTIDINE 20 MG/1
1 TABLET, FILM COATED ORAL EVERY 12 HOURS SCHEDULED
COMMUNITY
Start: 2023-12-15

## 2024-02-20 NOTE — PROGRESS NOTES
"Chief Complaint  Cough, unspecified type    Subjective    History of Present Illness      Hai Duarte presents to [facility]IC[facility] for:    History of Present Illness   Follow-up after having issues with a chronic cough and allergic rhinitis since having had COVID in the spring 2022.  He comes in today and reports that his cough is greatly reduced in frequency, severity and duration.  He is now only taking Singulair and using Nasacort as needed.  He will keep some low-dose Spiriva on hand to use when and if needed.  He can follow-up with pulmonology on an as-needed basis.  Objective   Vital Signs:   /68   Pulse 72   Ht 167.6 cm (66\")   Wt 62.6 kg (138 lb)   SpO2 98% Comment: RA  BMI 22.27 kg/m²     Physical Exam  Vitals and nursing note reviewed.   Constitutional:       General: He is not in acute distress.     Appearance: Normal appearance. He is well-developed.   HENT:      Head: Normocephalic and atraumatic.      Comments: Wearing a mask  Eyes:      General: Scleral icterus present.   Cardiovascular:      Rate and Rhythm: Normal rate and regular rhythm.   Pulmonary:      Effort: Pulmonary effort is normal.      Breath sounds: Normal breath sounds.   Abdominal:      General: There is no distension.   Musculoskeletal:         General: Normal range of motion.      Cervical back: Normal range of motion and neck supple.   Skin:     General: Skin is warm and dry.   Neurological:      Mental Status: He is alert and oriented to person, place, and time.   Psychiatric:         Mood and Affect: Mood normal.         Behavior: Behavior normal.        Result Review :       Results for orders placed during the hospital encounter of 04/17/23    Full Pulmonary Function Test With Bronchodilator    Narrative  [facility] - Pulmonary Function Test    [address] [place] [address]  [id number]  [phone]    Patient : Hai Duarte  MRN : [id number]  CSN : " [id number]  Pulmonologist : Joel Farrell MD  Date : 4/18/2023    ______________________________________________________________________    Interpretation :  1.  Spirometry reveals a decrease in midflows, and otherwise is within normal limits.  2.  There is actually worsening of midflows postbronchodilator and also worsening a peak expiratory flow postbronchodilator which is now at the lower range of normal.  Otherwise there is no significant change in spirometry and postbronchodilator spirometry is otherwise within normal limits.  3.  Lung volumes are consistent with a mild restrictive ventilatory defect and there is also a mild decrease in inspiratory capacity.  4.  Diffusion capacity is within normal limits particularly when corrected for alveolar volume.      Joel Farrell MD               Assessment and Plan      Diagnoses and all orders for this visit:    1. Cough, unspecified type (Primary)  Comments:  Improved and now only happens occasionally.  Utilize medications as needed.  No further pulmonary workup needed.    2. Allergic rhinitis, unspecified seasonality, unspecified trigger  Comments:  Stable.  Use Singulair and Nasacort as needed.    3. History of COVID-19  Comments:  From Spring of 2022.  Chronic cough has improved over the past 12 months.  Nothing further to do for now.      Mikie Diane, APRN  2/20/2024  14:30 CST    Follow Up   Return if symptoms worsen or fail to improve.    Patient was given instructions and counseling regarding his condition or for health maintenance advice. Please see specific information pulled into the AVS if appropriate.

## (undated) DEVICE — ENDOGATOR AUXILIARY WATER JET CONNECTOR: Brand: ENDOGATOR

## (undated) DEVICE — THE CHANNEL CLEANING BRUSH IS A NYLON FLEXI BRUSH ATTACHED TO A FLEXIBLE PLASTIC SHEATH DESIGNED TO SAFELY REMOVE DEBRIS FROM FLEXIBLE ENDOSCOPES.

## (undated) DEVICE — CUFF,BP,DISP,1 TUBE,ADULT,HP: Brand: MEDLINE

## (undated) DEVICE — SENSR O2 OXIMAX FNGR A/ 18IN NONSTR

## (undated) DEVICE — MSK O2 MD CONCENTR A/ LF 7FT 1P/U

## (undated) DEVICE — TBG SMPL FLTR LINE NASL 02/C02 A/ BX/100

## (undated) DEVICE — Device: Brand: DEFENDO AIR/WATER/SUCTION AND BIOPSY VALVE